# Patient Record
Sex: FEMALE | Race: WHITE
[De-identification: names, ages, dates, MRNs, and addresses within clinical notes are randomized per-mention and may not be internally consistent; named-entity substitution may affect disease eponyms.]

---

## 2022-07-20 ENCOUNTER — HOSPITAL ENCOUNTER (EMERGENCY)
Dept: HOSPITAL 76 - ED | Age: 72
Discharge: HOME | End: 2022-07-20
Payer: MEDICARE

## 2022-07-20 VITALS — SYSTOLIC BLOOD PRESSURE: 138 MMHG | DIASTOLIC BLOOD PRESSURE: 68 MMHG

## 2022-07-20 DIAGNOSIS — K57.32: Primary | ICD-10-CM

## 2022-07-20 DIAGNOSIS — Z79.899: ICD-10-CM

## 2022-07-20 DIAGNOSIS — G89.29: ICD-10-CM

## 2022-07-20 DIAGNOSIS — K80.20: ICD-10-CM

## 2022-07-20 DIAGNOSIS — Z87.891: ICD-10-CM

## 2022-07-20 LAB
ALBUMIN DIAFP-MCNC: 2.7 G/DL (ref 3.2–5.5)
ALBUMIN/GLOB SERPL: 0.6 {RATIO} (ref 1–2.2)
ALP SERPL-CCNC: 63 IU/L (ref 42–121)
ALT SERPL W P-5'-P-CCNC: 13 IU/L (ref 10–60)
ANION GAP SERPL CALCULATED.4IONS-SCNC: 12 MMOL/L (ref 6–13)
AST SERPL W P-5'-P-CCNC: 11 IU/L (ref 10–42)
BASOPHILS NFR BLD AUTO: 0 10^3/UL (ref 0–0.1)
BASOPHILS NFR BLD AUTO: 0.3 %
BILIRUB BLD-MCNC: 1 MG/DL (ref 0.2–1)
BILIRUB UR QL CFM: NEGATIVE
BUN SERPL-MCNC: 9 MG/DL (ref 6–20)
CALCIUM UR-MCNC: 8.9 MG/DL (ref 8.5–10.3)
CHLORIDE SERPL-SCNC: 102 MMOL/L (ref 101–111)
CLARITY UR REFRACT.AUTO: CLEAR
CO2 SERPL-SCNC: 25 MMOL/L (ref 21–32)
CREAT SERPLBLD-SCNC: 0.7 MG/DL (ref 0.4–1)
EOSINOPHIL # BLD AUTO: 0 10^3/UL (ref 0–0.7)
EOSINOPHIL NFR BLD AUTO: 0.1 %
ERYTHROCYTE [DISTWIDTH] IN BLOOD BY AUTOMATED COUNT: 12.3 % (ref 12–15)
GFRSERPLBLD MDRD-ARVRAT: 82 ML/MIN/{1.73_M2} (ref 89–?)
GLOBULIN SER-MCNC: 4.3 G/DL (ref 2.1–4.2)
GLUCOSE SERPL-MCNC: 119 MG/DL (ref 70–100)
GLUCOSE UR QL STRIP.AUTO: NEGATIVE MG/DL
HCT VFR BLD AUTO: 34.8 % (ref 37–47)
HGB UR QL STRIP: 11.8 G/DL (ref 12–16)
KETONES UR QL STRIP.AUTO: 40 MG/DL
LIPASE SERPL-CCNC: 21 U/L (ref 22–51)
LYMPHOCYTES # SPEC AUTO: 1.4 10^3/UL (ref 1.5–3.5)
LYMPHOCYTES NFR BLD AUTO: 9.9 %
MCH RBC QN AUTO: 32.2 PG (ref 27–31)
MCHC RBC AUTO-ENTMCNC: 33.9 G/DL (ref 32–36)
MCV RBC AUTO: 94.8 FL (ref 81–99)
MONOCYTES # BLD AUTO: 1.5 10^3/UL (ref 0–1)
MONOCYTES NFR BLD AUTO: 10.9 %
NEUTROPHILS # BLD AUTO: 10.9 10^3/UL (ref 1.5–6.6)
NEUTROPHILS # SNV AUTO: 14 X10^3/UL (ref 4.8–10.8)
NEUTROPHILS NFR BLD AUTO: 78 %
NITRITE UR QL STRIP.AUTO: NEGATIVE
NRBC # BLD AUTO: 0 /100WBC
NRBC # BLD AUTO: 0 X10^3/UL
PDW BLD AUTO: 9.2 FL (ref 7.9–10.8)
PH UR STRIP.AUTO: 5.5 PH (ref 5–7.5)
PLATELET # BLD: 370 10^3/UL (ref 130–450)
POTASSIUM SERPL-SCNC: 3.5 MMOL/L (ref 3.5–5)
PROT SPEC-MCNC: 7 G/DL (ref 6.7–8.2)
PROT UR STRIP.AUTO-MCNC: NEGATIVE MG/DL
RBC # UR STRIP.AUTO: (no result) /UL
RBC # URNS HPF: (no result) /HPF (ref 0–5)
RBC MAR: 3.67 10^6/UL (ref 4.2–5.4)
RBC MORPH BLD: (no result)
SODIUM SERPLBLD-SCNC: 139 MMOL/L (ref 135–145)
SP GR UR STRIP.AUTO: 1.02 (ref 1–1.03)
SQUAMOUS URNS QL MICRO: (no result)
UROBILINOGEN UR QL STRIP.AUTO: (no result) E.U./DL
UROBILINOGEN UR STRIP.AUTO-MCNC: NEGATIVE MG/DL
WBC # UR MANUAL: (no result) /HPF (ref 0–5)

## 2022-07-20 PROCEDURE — 81003 URINALYSIS AUTO W/O SCOPE: CPT

## 2022-07-20 PROCEDURE — 83690 ASSAY OF LIPASE: CPT

## 2022-07-20 PROCEDURE — 96374 THER/PROPH/DIAG INJ IV PUSH: CPT

## 2022-07-20 PROCEDURE — 85025 COMPLETE CBC W/AUTO DIFF WBC: CPT

## 2022-07-20 PROCEDURE — 87086 URINE CULTURE/COLONY COUNT: CPT

## 2022-07-20 PROCEDURE — 80053 COMPREHEN METABOLIC PANEL: CPT

## 2022-07-20 PROCEDURE — 36415 COLL VENOUS BLD VENIPUNCTURE: CPT

## 2022-07-20 PROCEDURE — 81001 URINALYSIS AUTO W/SCOPE: CPT

## 2022-07-20 PROCEDURE — 99284 EMERGENCY DEPT VISIT MOD MDM: CPT

## 2022-07-20 NOTE — ED PHYSICIAN DOCUMENTATION
PD HPI FEMALE 





- Stated complaint


Stated Complaint: RT SIDE PAIN,BLOOD IN URINE





- Chief complaint


Chief Complaint: Abd Pain





- History obtained from


History obtained from: Patient





- History of Present Illness


Timing - onset: How many days ago (3)





- Additional information


Additional information: 





72-year-old female with history of chronic joint pains on meloxicam presents for

3 days of right-sided abdominal pain.  Pain is sharp, does not radiate, 

constant, worse with movement.  Patient states that she also had a tinge of 

blood in her urine when symptoms started.  She presented to an urgent care 

facility, where they did see blood, however there is no evidence for UTI.  They 

recommended that she come to the emergency department for evaluation of possible

kidney stone.  Patient denies history of kidney stones, denies nausea, vomiting,

fevers, chills, constipation, diarrhea, any other complaints at this time.  

Patient states that she stopped her meloxicam at the recommendation of her 

orthopedic surgeon, for fear that it might be relating to her stomach problems.





Review of Systems


Ten Systems: 10 systems reviewed and negative


Constitutional: denies: Fever, Chills


Cardiac: denies: Chest pain / pressure, Palpitations, Pedal edema


Respiratory: denies: Dyspnea, Cough, Wheezing


GI: reports: Abdominal Pain.  denies: Abdominal Swelling, Nausea, Vomiting, 

Constipation, Diarrhea, Hematemesis, Bloody / black stool


: reports: Hematuria.  denies: Dysuria, Frequency, Hesitancy





PD PAST MEDICAL HISTORY





- Past Medical History


Past Medical History: No


Cardiovascular: None


Respiratory: None


Neuro: None


Endocrine/Autoimmune: None


GI: None


GYN: Endometriosis


: None


HEENT: None


Psych: None


Musculoskeletal: Osteoarthritis, Chronic back pain


Derm: None





- Past Surgical History


Past Surgical History: Yes


Ortho: Hip replacement, Knee replacement, Spine surgery, Other


/GYN: Hysterectomy





- Present Medications


Home Medications: 


                                Ambulatory Orders











 Medication  Instructions  Recorded  Confirmed


 


Amox/Clav 875/125 [Augmentin] 1 each PO Q12H #20 tablet 07/20/22 


 


Meloxicam [Mobic] 15 mg PO DAILY 07/20/22 07/20/22


 


Oxycodone HCl/Acetaminophen 1 - 2 each PO Q6H PRN #14 tablet 07/20/22 





[Percocet 5-325 mg Tablet]   














- Allergies


Allergies/Adverse Reactions: 


                                    Allergies











Allergy/AdvReac Type Severity Reaction Status Date / Time


 


No Known Drug Allergies Allergy   Verified 07/20/22 08:50














- Social History


Does the pt smoke?: No


Smoking Status: Former smoker


Does the pt drink ETOH?: Yes


Does the pt have substance abuse?: No





- Immunizations


Immunizations are current?: Yes





PD ED PE NORMAL





- Vitals


Vital signs reviewed: Yes





- General


General: Alert and oriented X 3, No acute distress, Well developed/nourished





- HEENT


HEENT: Atraumatic, PERRL, EOMI, Ears normal, Moist mucous membranes, Pharynx 

benign, Dentition benign





- Neck


Neck: Supple, no meningeal sign, No bony TTP, No adenopathy





- Cardiac


Cardiac: RRR, No gallop, Strong equal pulses





- Respiratory


Respiratory: No respiratory distress, Clear bilaterally





- Abdomen


Abdomen: Soft, Non distended, No organomegaly, Other (RUQ tender to deep 

palpation w/o rebound or guarding. Negative Burroughs's sign)





- Female 


Female : Deferred





- Rectal


Rectal: Deferred





- Back


Back: No CVA TTP, No spinal TTP





- Derm


Derm: Normal color, Warm and dry, No rash





- Neuro


Neuro: Alert and oriented X 3, CNs 2-12 intact, No motor deficit, No sensory 

deficit, Normal speech





- Psych


Psych: Normal mood, Normal affect





Results





- Vitals


Vitals: 


                               Vital Signs - 24 hr











  07/20/22 07/20/22





  08:51 10:40


 


Temperature 37.2 C 


 


Heart Rate 98 82


 


Respiratory 16 16





Rate  


 


Blood Pressure 136/87 H 138/68 H


 


O2 Saturation 100 96








                                     Oxygen











O2 Source                      Room air

















- Labs


Labs: 


                                Laboratory Tests











  07/20/22 07/20/22 07/20/22





  09:14 09:14 10:05


 


WBC  14.0 H  


 


RBC  3.67 L  


 


Hgb  11.8 L  


 


Hct  34.8 L  


 


MCV  94.8  


 


MCH  32.2 H  


 


MCHC  33.9  


 


RDW  12.3  


 


Plt Count  370  


 


MPV  9.2  


 


Neut # (Auto)  10.9 H  


 


Lymph # (Auto)  1.4 L  


 


Mono # (Auto)  1.5 H  


 


Eos # (Auto)  0.0  


 


Baso # (Auto)  0.0  


 


Absolute Nucleated RBC  0.00  


 


Nucleated RBC %  0.0  


 


Manual Slide Review  Indicated  


 


RBC Morph Micro Appear  1+ ANISOCYTOSIS  


 


Sodium   139 


 


Potassium   3.5 


 


Chloride   102 


 


Carbon Dioxide   25 


 


Anion Gap   12.0 


 


BUN   9 


 


Creatinine   0.7 


 


Estimated GFR (MDRD)   82 L 


 


Glucose   119 H 


 


Calcium   8.9 


 


Total Bilirubin   1.0 


 


AST   11 


 


ALT   13 


 


Alkaline Phosphatase   63 


 


Total Protein   7.0 


 


Albumin   2.7 L 


 


Globulin   4.3 H 


 


Albumin/Globulin Ratio   0.6 L 


 


Lipase   21 L 


 


Urine Color    DARK YELLOW


 


Urine Clarity    CLEAR


 


Urine pH    5.5


 


Ur Specific Gravity    1.025


 


Urine Protein    NEGATIVE


 


Urine Glucose (UA)    NEGATIVE


 


Urine Ketones    40 H


 


Urine Occult Blood    SMALL H


 


Urine Nitrite    NEGATIVE


 


Urine Bilirubin    NEGATIVE


 


Urine Urobilinogen    1 (NORMAL)


 


Ur Leukocyte Esterase    NEGATIVE


 


Urine RBC    0-5


 


Urine WBC    0-3


 


Ur Squamous Epith Cells    MOD Squamous H


 


Urine Bacteria    Few


 


Ur Microscopic Review    INDICATED


 


Urine Culture Comments    NOT INDICATED














PD MEDICAL DECISION MAKING





- ED course


Complexity details: reviewed old records, reviewed results, re-evaluated 

patient, d/w patient


ED course: 





Well-appearing female presenting for several days of flank/right upper abdominal

pain.  Abdomen soft, no peritoneal signs, hemodynamically stable.  Patient found

to have gallstones on CT, no evidence of cholecystitis.  Additionally 

noncontrast CT is significant for diverticulitis, underlying mass not ruled out.

 Patient states that she is never had a colonoscopy before, but has had several 

negative Cologuard tests.  Patient was informed of the results of her labs and 

imaging, recommended close follow-up with general surgery and GI.  Patient 

states that she is from [place] and visiting over the next 3 weeks, but did 

request a referral to specialist in the area, which was provided.  Patient given

antibiotics and pain medications.  She was counseled that she would need to take

daily laxatives with her medications in order to prevent constipation and 

worsening symptoms. At time of discharge patient tolerating po, no acute 

distress, hemodynamically stable.





Departure





- Departure


Disposition: 01 Home, Self Care


Clinical Impression: 


 Diverticulitis, Abnormal abdominal CT scan





Cholelithiasis


Qualifiers:


 Cholelithiasis location: gallbladder Cholecystitis presence: without 

cholecystitis Biliary obstruction: without biliary obstruction Qualified 

Code(s): K80.20 - Calculus of gallbladder without cholecystitis without 

obstruction





Instructions:  Gallstones, Diverticulitis Dc


Prescriptions: 


Amox/Clav 875/125 [Augmentin] 1 each PO Q12H #20 tablet


Oxycodone HCl/Acetaminophen [Percocet 5-325 mg Tablet] 1 - 2 each PO Q6H PRN #14

tablet


 PRN Reason: pain


Comments: 


FOLLOW UP WITH A GENERAL SURGEON OR A GASTROENTEROLOGIST WITHIN 1 WEEK. RETURN 

IMMEDIATELY IF YOU NOTICE WORSENING PAIN, FEVERS, NAUSEA/VOMITING, OR ARE UNABLE

TO TAKE YOUR MEDICATIONS.


Discharge Date/Time: 07/20/22 11:41

## 2022-07-20 NOTE — CT REPORT
PROCEDURE:  Abdomen/Pelvis WO

 

INDICATIONS:  R FLANK PAIN

 

TECHNIQUE:  

Noncontrast 5 mm thick sections acquired from the diaphragms to the symphysis.  5 mm coronal and sagi
ttal reformats were then performed.  For radiation dose reduction, the following was used:  automated
 exposure control, adjustment of mA and/or kV according to patient size.

 

COMPARISON:  None.

 

FINDINGS:  

Image quality:  Excellent.  

 

ABDOMEN:  

Lung bases:  Lung bases are clear.  Heart size is normal.  

 

Solid organs:  Liver and spleen are normal in size.  Gallbladder contains multiple calcified gallston
es in its dependent portion. No gross gallbladder wall thickening or pericholecystic fluid.  Pancreas
 is normal in contours.  No adrenal nodules.  Kidneys are normal in size, without hydronephrosis or n
ephrolithiasis.  

 

Peritoneum and bowel: There is no bowel obstruction. Significant wall thickening and adjacent mesente
sergey fat stranding involving ascending colon near hepatic flexure is seen. Numerous colonic diverticul
i are seen. No abscess collection. No free fluid of free air.

 

Nodes and vessels:  No retroperitoneal or mesenteric adenopathy by size criteria.  Aorta and inferior
 vena cava are normal in caliber.  

 

Miscellaneous:  No ventral hernias.  

 

 

PELVIS:  

Genitourinary:  Bladder wall thickness is normal.  

 

Miscellaneous:  No inguinal hernias or adenopathy.  

 

Bones:  No suspicious bony lesions. Extensive post fusion changes are noted at L2-S1 levels. No acute
 vertebral body compression fractures. There is also prior left hip arthroplasty.

 

IMPRESSION:  

1. Finding is suggestive of acute diverticulitis involving ascending colon near hepatic flexure. No a
bscess collection. No free fluid of free air. No bowel obstruction. Underlying malignant process sandra
ot be entirely excluded. Clinical follow-up and possible endoscopic correlation is recommended.

2. No renal stone or hydronephrosis. Normal-appearing urinary bladder.

3. Cholelithiasis without CT evidence of acute cholecystitis. No biliary ductal dilatation.

 

Reviewed by: Thanh Matthews MD on 7/20/2022 9:42 AM PDT

Approved by: Thanh Matthews MD on 7/20/2022 9:42 AM PDT

 

 

Station ID:  [id number]

## 2022-07-20 NOTE — EXTERNAL MEDICAL SUMMARY RPT
Continuity of Care Document

                            Created on:2022



Patient:YELITZA DEE

Sex:Female

:1950

External Reference #:[id number]





Demographics







                          Address                   [address] [place] [id number]

 

                          Phone                     Unavailable

 

                          Preferred Language        Unknown

 

                          Marital Status            Unknown

 

                          Gnosticist Affiliation     Unknown

 

                          Race                      Unknown

 

                          Ethnic Group              Unknown









Author







                          Organization              [facility]

 

                          [place] [address]

 

                          Phone                     [phone]









Allergies

No information.



Encounters

No information.



Functional Status

No information.



Immunizations

No information.



Medications







                     date                description         facility

 

                     [id number]  meloxicam           [facility] 

Services



                                                            [place]

 

                     [id number]  meloxicam           [facility] 

[facility]



                                                            [place]







Problems

No information.



Procedures







                     date                description         facility

 

                     [id number]  Visit Code Hold     [facility]



                                                            [facility] [place]

 

                     [id number]  Urine C&S           [facility]



                                                            [facility]







Results/Labs

No information.



Social History







                     date                description         facility

 

                     [id number]+0000  Former smoker       [facility]



                                                            [facility] [place]







Vital Signs







                 date            measurement     value           units









              [id number]+0000  BMI          BMI          30.73        kg/m2

 

              [id number]+0000  BP_diastolic  BP_diastolic  73           mm[H

g]

 

              [id number]+0000  BP_systolic  BP_systolic  130          mm[Hg]

 

              [id number]+0000  heart_rate   heart_rate   87           /min

 

              [id number]+0000  height_metric  height_metric  165.1        cm

 

              [id number]+0000  height_standard  height_standard  65         

  in

 

              [id number]+0000  respiration_rate  respiration_rate  16       

    /min

 

              [id number]+0000  temperature_metric  temperature_metric  37   

        C

 

              [id number]+0000  temperature_standard  temperature_standard  9

8.6         F

 

              [id number]+0000  weight_metric  weight_metric  83.46        kg

 

              [id number]+0000  weight_standard  weight_standard  184        

  lb